# Patient Record
Sex: MALE | Race: WHITE | Employment: FULL TIME | URBAN - NONMETROPOLITAN AREA
[De-identification: names, ages, dates, MRNs, and addresses within clinical notes are randomized per-mention and may not be internally consistent; named-entity substitution may affect disease eponyms.]

---

## 2024-05-14 ENCOUNTER — HOSPITAL ENCOUNTER (EMERGENCY)
Age: 54
Discharge: HOME | End: 2024-05-14
Payer: COMMERCIAL

## 2024-05-14 VITALS
WEIGHT: 230 LBS | RESPIRATION RATE: 18 BRPM | HEIGHT: 70 IN | DIASTOLIC BLOOD PRESSURE: 75 MMHG | HEART RATE: 80 BPM | TEMPERATURE: 98.4 F | SYSTOLIC BLOOD PRESSURE: 143 MMHG | OXYGEN SATURATION: 95 % | BODY MASS INDEX: 32.93 KG/M2

## 2024-05-14 DIAGNOSIS — S61.412A LACERATION OF LEFT HAND WITHOUT FOREIGN BODY, INITIAL ENCOUNTER: Primary | ICD-10-CM

## 2024-05-14 PROCEDURE — 12001 RPR S/N/AX/GEN/TRNK 2.5CM/<: CPT

## 2024-05-14 PROCEDURE — 99283 EMERGENCY DEPT VISIT LOW MDM: CPT

## 2024-05-14 RX ORDER — CEPHALEXIN 500 MG/1
500 CAPSULE ORAL 4 TIMES DAILY
Qty: 28 CAPSULE | Refills: 0 | Status: SHIPPED | OUTPATIENT
Start: 2024-05-14 | End: 2024-05-21

## 2024-05-14 ASSESSMENT — PAIN DESCRIPTION - ONSET: ONSET: SUDDEN

## 2024-05-14 ASSESSMENT — PAIN DESCRIPTION - FREQUENCY: FREQUENCY: CONTINUOUS

## 2024-05-14 ASSESSMENT — PAIN DESCRIPTION - DESCRIPTORS: DESCRIPTORS: SHARP

## 2024-05-14 ASSESSMENT — LIFESTYLE VARIABLES
HOW MANY STANDARD DRINKS CONTAINING ALCOHOL DO YOU HAVE ON A TYPICAL DAY: PATIENT DOES NOT DRINK
HOW OFTEN DO YOU HAVE A DRINK CONTAINING ALCOHOL: NEVER

## 2024-05-14 ASSESSMENT — PAIN - FUNCTIONAL ASSESSMENT: PAIN_FUNCTIONAL_ASSESSMENT: NONE - DENIES PAIN

## 2024-05-14 ASSESSMENT — PAIN DESCRIPTION - ORIENTATION: ORIENTATION: LEFT

## 2024-05-14 ASSESSMENT — PAIN DESCRIPTION - PAIN TYPE: TYPE: ACUTE PAIN

## 2024-05-14 ASSESSMENT — PAIN DESCRIPTION - LOCATION: LOCATION: HAND

## 2024-05-14 NOTE — DISCHARGE INSTRUCTIONS
Gently clean wound with soap and water daily but no soaking or submerging wound.  Suture removal in 10 days.  Return to the ER sooner for any worsening symptoms or signs of infection such as redness, drainage, increased pain or swelling.  May take Tylenol or ibuprofen as needed for pain.  Referral provided to hand specialist as needed for any persistent symptoms.

## 2024-05-14 NOTE — ED PROVIDER NOTES
Close  Repair type:     Repair type:  Simple  Post-procedure details:     Dressing:  Non-adherent dressing and bulky dressing    Procedure completion:  Tolerated well, no immediate complications      CRITICAL CARE TIME (.cctime)   0    PAST MEDICAL HISTORY      has a past medical history of Hypertension.     EMERGENCY DEPARTMENT COURSE and DIFFERENTIAL DIAGNOSIS/MDM:   Vitals:    Vitals:    05/14/24 1330   BP: (!) 143/75   Pulse: 80   Resp: 18   Temp: 98.4 °F (36.9 °C)   TempSrc: Oral   SpO2: 95%   Weight: 104.3 kg (230 lb)   Height: 1.778 m (5' 10\")       Patient was given the following medications:  Medications - No data to display          Is this patient to be included in the SEP-1 Core Measure due to severe sepsis or septic shock?   No   Exclusion criteria - the patient is NOT to be included for SEP-1 Core Measure due to:      Chronic Conditions affecting care:    has a past medical history of Hypertension.    CONSULTS: (Who and What was discussed)  None          Records Reviewed (External and Source)     CC/HPI Summary, DDx, ED Course, and Reassessment:       Patient presented to the ER for evaluation of left hand laceration he accidentally cut/stabbed self with a razor blade when he was working on his car prior to arrival initially put glue on the wound but did not stop bleeding so he came in for evaluation.  On exam he has a small 1.5 cm open wound left lateral hand proximal to the thumb bleeding is controlled on my exam.  He does have bruising and swelling at the site.  He is neurovascularly intact.  No bony tenderness and wound is fleshy area of hand and I have very low suspicion for fracture foreign body with this type of injury thus x-ray not warranted patient is in agreement.  Wound was locally anesthetized with lidocaine.  Cleaned with Hibiclens and irrigated with saline and syringe.  2 sutures of 5-0 Prolene were placed.  Patient tolerated procedure well.  No complications nonstick gauze wrap will be